# Patient Record
Sex: FEMALE | Race: BLACK OR AFRICAN AMERICAN | Employment: OTHER | URBAN - METROPOLITAN AREA
[De-identification: names, ages, dates, MRNs, and addresses within clinical notes are randomized per-mention and may not be internally consistent; named-entity substitution may affect disease eponyms.]

---

## 2024-10-28 RX ORDER — CARVEDILOL 25 MG/1
25 TABLET ORAL 2 TIMES DAILY WITH MEALS
COMMUNITY

## 2024-11-08 ENCOUNTER — ANESTHESIA (OUTPATIENT)
Dept: ENDOSCOPY | Facility: HOSPITAL | Age: 49
End: 2024-11-08
Payer: COMMERCIAL

## 2024-11-08 ENCOUNTER — HOSPITAL ENCOUNTER (OUTPATIENT)
Facility: HOSPITAL | Age: 49
Setting detail: HOSPITAL OUTPATIENT SURGERY
Discharge: HOME | End: 2024-11-08
Attending: INTERNAL MEDICINE | Admitting: INTERNAL MEDICINE
Payer: COMMERCIAL

## 2024-11-08 ENCOUNTER — ANESTHESIA EVENT (OUTPATIENT)
Dept: ENDOSCOPY | Facility: HOSPITAL | Age: 49
End: 2024-11-08
Payer: COMMERCIAL

## 2024-11-08 VITALS
WEIGHT: 196 LBS | HEIGHT: 63 IN | TEMPERATURE: 98 F | HEART RATE: 63 BPM | DIASTOLIC BLOOD PRESSURE: 59 MMHG | RESPIRATION RATE: 22 BRPM | SYSTOLIC BLOOD PRESSURE: 112 MMHG | BODY MASS INDEX: 34.73 KG/M2 | OXYGEN SATURATION: 100 %

## 2024-11-08 LAB — B-HCG UR QL: NEGATIVE

## 2024-11-08 PROCEDURE — 0DJD8ZZ INSPECTION OF LOWER INTESTINAL TRACT, VIA NATURAL OR ARTIFICIAL OPENING ENDOSCOPIC: ICD-10-PCS | Performed by: INTERNAL MEDICINE

## 2024-11-08 PROCEDURE — 81025 URINE PREGNANCY TEST: CPT

## 2024-11-08 RX ORDER — SODIUM CHLORIDE, SODIUM LACTATE, POTASSIUM CHLORIDE, CALCIUM CHLORIDE 600; 310; 30; 20 MG/100ML; MG/100ML; MG/100ML; MG/100ML
INJECTION, SOLUTION INTRAVENOUS CONTINUOUS
Status: DISCONTINUED | OUTPATIENT
Start: 2024-11-08 | End: 2024-11-08

## 2024-11-08 RX ORDER — NALOXONE HYDROCHLORIDE 0.4 MG/ML
0.08 INJECTION, SOLUTION INTRAMUSCULAR; INTRAVENOUS; SUBCUTANEOUS ONCE AS NEEDED
Status: DISCONTINUED | OUTPATIENT
Start: 2024-11-08 | End: 2024-11-08

## 2024-11-08 RX ADMIN — SODIUM CHLORIDE, SODIUM LACTATE, POTASSIUM CHLORIDE, CALCIUM CHLORIDE: 600; 310; 30; 20 INJECTION, SOLUTION INTRAVENOUS at 10:22:00

## 2024-11-08 NOTE — OPERATIVE REPORT
Colonoscopy Operative Report    Kimberly Evangelista Patient Status:  Hospital Outpatient Surgery    1975 MRN [id number]   [facility] ENDOSCOPY PAIN CENTER Attending Wade Jay MD   Hosp Day #   0 PCP Apoorva Squires MD     Pre-Operative Diagnosis: colon cancer screening    Post-Operative Diagnosis:   normal colon    Procedure Performed: Procedure(s):  COLONOSCOPY     Informed Consent: Informed consent for both the procedure and sedation were obtained from the patient. The potentially life-threatening complications of sedation, bleeding,  perforation, transfusion or repeat endoscopy  were reviewed along with the possible need for hospitalization, surgical management, transfusion or repeat endoscopy should one of these complications arise. The patient understands and is agreeable to proceed.  Sedation Type: MAC-Patient received sedation with monitored anesthesia provided by an anesthesiologist    Cecum Withdrawal Time:  6 minutes  Date of previous colonoscopy: none    Procedure Description: The patient was placed in the left lateral decubitus position.  After careful digital rectal examination, the Adult colonoscope was inserted into the rectum and advanced to the level of the cecum under direct visualization. The cecum was identified by landmarks, including the appendiceal orifice and ileoceccal valve. Careful examination of the entire colon was performed during withdrawal of the endoscope. The scope was withdrawn to the rectum and retroflexion was performed.  The patient tolerated the procedure well with no immediate complications. The patient was transferred to the recovery area in stable condition.  Quality of Preparation: Adequate  Aronchick Bowel Prep Scale:  good  Findings: normal colon  Recommendations: colonoscopy in 10 years  Discharge:  The patient was given an after visit summary detailing the procedure, findings, recommendations and  follow up plans.     Wade Jay MD  11/8/2024  9:27 AM

## 2024-11-08 NOTE — ANESTHESIA POSTPROCEDURE EVALUATION
[facility]    Kimberly Evangelista Patient Status:  Hospital Outpatient Surgery   Age/Gender 49 year old female MRN [id number]   Location [facility] ENDOSCOPY PAIN CENTER Attending Wade Jay MD   Hosp Day # 0 PCP Apoorva Squires MD       Anesthesia Post-op Note    COLONOSCOPY    Procedure Summary       Date: 11/08/24 Room / Location:  ENDOSCOPY 03 / [facility] ENDOSCOPY    Anesthesia Start: 0957 Anesthesia Stop:     Procedure: COLONOSCOPY Diagnosis: (normal)    Surgeons: Wade Jay MD Anesthesiologist: Shelby Echevarria DO    Anesthesia Type: MAC ASA Status: 3            Anesthesia Type: MAC    Vitals Value Taken Time   BP 99/50 11/08/24 1026   Temp  11/08/24 1027   Pulse 81 11/08/24 1027   Resp 16 11/08/24 1027   SpO2 100 % 11/08/24 1027   Vitals shown include unfiled device data.    Patient Location: Endoscopy    Anesthesia Type: MAC    Airway Patency: patent    Postop Pain Control: adequate    Mental Status: mildly sedated but able to meaningfully participate in the post-anesthesia evaluation    Nausea/Vomiting: none    Cardiopulmonary/Hydration status: stable euvolemic    Complications: no apparent anesthesia related complications    Postop vital signs: stable    Dental Exam: Unchanged from Preop    Patient to be discharged home when criteria met.

## 2024-11-08 NOTE — ANESTHESIA PREPROCEDURE EVALUATION
PRE-OP EVALUATION    Patient Name: Kimberly Evangelista    Admit Diagnosis: colon cancer screening    Pre-op Diagnosis: colon cancer screening    COLONOSCOPY    Anesthesia Procedure: COLONOSCOPY    Surgeons and Role:     * Wade Jay MD - Primary    Pre-op vitals reviewed.  Temp: 97.9 °F (36.6 °C)  Pulse: 59  Resp: 22  BP: 106/48  SpO2: 100 %  Body mass index is 34.72 kg/m².    Current medications reviewed.  Hospital Medications:   lactated ringers infusion   Intravenous Continuous       Outpatient Medications:   Prescriptions Prior to Admission[1]    Allergies: Patient has no known allergies.      Anesthesia Evaluation    Patient summary reviewed.    Anesthetic Complications  (-) history of anesthetic complications         GI/Hepatic/Renal    Negative GI/hepatic/renal ROS.                             Cardiovascular  Comment: Conclusions    Summary:    1. Left ventricle: The cavity size is normal. Wall thickness is normal.    Systolic function is normal by visual assessment. The estimated ejection    fraction is 60-65%. Wall motion is normal; there are no regional wall    motion abnormalities. Left ventricular diastolic function parameters are    normal.  2. Right ventricle: The cavity size is normal. Wall thickness is normal.    Systolic function is normal by visual assessment. The RV pressure during    systole is 40mm Hg.  3. Left atrium: The atrial volume is mildly increased.  4. Tricuspid valve: There is mild regurgitation.  Impressions:  Compared to prior study dated 5/22/2024, left ventricular  systolic function has normalized (prior LVEF 50%).    Prepared and signed by  Kike Almanzar MD.  10/10/2024 16:14          Negative cardiovascular ROS.  ECG reviewed.  Exercise tolerance: good     MET: >4      (+) hypertension           (+) pacemaker/AICD (nicm)          (+) CHF                Endo/Other    Negative endo/other ROS.                              Pulmonary    Negative pulmonary ROS.                        Patient here today for follow up PCA, hx of DVP, rising PSA 0.16^    Concerns include incontinence- dribbles no ppd.  Denies symptoms frequency, urgency, intermittency, incomplete emptying, weak stream, nocturia, hematuria, dysuria and straining.    Denies known Latex allergy or symptoms of Latex sensitivity.  Medications verified, no changes.    Patient reports taking the Revatio- image distortion, odd colors, stopped taking 2-3 months ago          Neuro/Psych    Negative neuro/psych ROS.                                  Past Surgical History:   Procedure Laterality Date    Cardiac pacemaker placement      Cataract      Eye surgery       Social History     Socioeconomic History    Marital status:    Tobacco Use    Smoking status: Never    Smokeless tobacco: Never   Vaping Use    Vaping status: Never Used   Substance and Sexual Activity    Alcohol use: Never    Drug use: Never     History   Drug Use Unknown     Available pre-op labs reviewed.               Airway      Mallampati: I  Mouth opening: >3 FB  TM distance: 4 - 6 cm  Neck ROM: full Cardiovascular    Cardiovascular exam normal.  Rhythm: regular  Rate: normal  (-) murmur   Dental    Dentition appears grossly intact         Pulmonary    Pulmonary exam normal.  Breath sounds clear to auscultation bilaterally.               Other findings              ASA: 3   Plan: MAC  NPO status verified and patient meets guidelines.  Patient has not taken beta blockers in last 24 hours.  Post-procedure pain management plan discussed with surgeon and patient.      Plan/risks discussed with: patient                Present on Admission:  **None**             [1]   Medications Prior to Admission   Medication Sig Dispense Refill Last Dose/Taking    sacubitril-valsartan 24-26 MG Oral Tab Take 1 tablet by mouth 2 (two) times daily.   11/8/2024 Morning    carvedilol 25 MG Oral Tab Take 1 tablet (25 mg total) by mouth 2 (two) times daily with meals.   11/8/2024 Morning    semaglutide-weight management 0.5 MG/0.5ML Subcutaneous Solution Auto-injector Inject 0.5 mL (0.5 mg total) into the skin once a week.   10/26/2024

## 2024-11-08 NOTE — H&P
[facility]   part of [facility]    History & Physical    Kimberly Evangelista Patient Status:  Hospital Outpatient Surgery    1975 MRN [id number]   Location [facility] ENDOSCOPY PAIN CENTER Attending Wade Jay MD   Hosp Day # 0 PCP Apoorva Squires MD     Date:  2024  Date of Admission:  2024    History provided by:patient  Chief Complaint:   colon cancer screening      HPI:   Kimberly Evangelista is a(n) 49 year old female. Here for colon cancer screening    History     Past Medical History:    Arrhythmia    Cardiomyopathy (HCC)    Cataract    Congestive heart disease (HCC)    Glaucoma    Heart attack (HCC)    High blood pressure    Visual impairment    GLASSES     Past Surgical History:   Procedure Laterality Date    Cardiac pacemaker placement      Cataract      Eye surgery       History reviewed. No pertinent family history.  Social History:  Social History     Socioeconomic History    Marital status:    Tobacco Use    Smoking status: Never    Smokeless tobacco: Never   Vaping Use    Vaping status: Never Used   Substance and Sexual Activity    Alcohol use: Never    Drug use: Never     Social Drivers of Health     Food Insecurity: No Food Insecurity (2024)    Received from [facility]    Food Insecurity     Currently or in the past 3 months, have you worried your food would run out before you had money to buy more?: No     In the past 12 months, have you run out of food or been unable to get more?: No   Transportation Needs: No Transportation Needs (2024)    Received from [facility]    Transportation Needs     Medical Transportation Needs?: No    Received from [facility]    Social Connections    Received from [facility]    Housing Stability     Allergies/Medications:   Allergies: Allergies[1]  Medications Prior to Admission   Medication Sig    sacubitril-valsartan 24-26 MG Oral Tab Take 1  tablet by mouth 2 (two) times daily.    carvedilol 25 MG Oral Tab Take 1 tablet (25 mg total) by mouth 2 (two) times daily with meals.    semaglutide-weight management 0.5 MG/0.5ML Subcutaneous Solution Auto-injector Inject 0.5 mL (0.5 mg total) into the skin once a week.       Review of Systems:   Pertinent items are noted in HPI.  A comprehensive review of systems was negative.    Physical Exam:   Vital Signs:  Height 5' 3\" (1.6 m), weight 194 lb (88 kg), last menstrual period 10/21/2024.     General appearance:  alert, appears stated age, and cooperative  Head: Normocephalic, without obvious abnormality, atraumatic  Pulmonary: clear to auscultation bilaterally  Cardiovascular: S1, S2 normal, no murmur, click, rub or gallop, regular rate and rhythm  Abdominal: soft, non-tender; bowel sounds normal; no masses,  no organomegaly  Extremities: extremities normal, atraumatic, no cyanosis or edema        Results:   No results found for: \"WBC\", \"HGB\", \"HCT\", \"PLT\", \"CREATSERUM\", \"BUN\", \"NA\", \"K\", \"CL\", \"CO2\", \"GLU\", \"CA\", \"ALB\", \"ALKPHO\", \"BILT\", \"TP\", \"AST\", \"ALT\", \"PTT\", \"INR\", \"PT\", \"T4F\", \"TSH\", \"TSHREFLEX\", \"ELIN\", \"LIP\", \"GGT\", \"PSA\", \"DDIMER\", \"ESRML\", \"ESRPF\", \"CRP\", \"BNP\", \"MG\", \"PHOS\", \"TROP\", \"CK\", \"CKMB\", \"CHICA\", \"RPR\", \"B12\", \"ETOH\", \"POCGLU\"    No results found.        Assessment/Plan:    colonoscopy      Wade Jay MD  11/8/2024         [1] No Known Allergies

## 2024-11-08 NOTE — DISCHARGE INSTRUCTIONS
ENDOSCOPY DISCHARGE INSTRUCTIONS    Procedure Performed:   Colonoscopy    Endoscopist: No name on file  FINDINGS:   Normal colon    MEDICATIONS:  You may resume all other medications today    DIET:  Resume Normal Diet    BIOPSIES:  No biopsies were taken    X-RAYS/LABS:   No X-rays/Labs were ordered today    ADDITIONAL RECOMMENDATIONS:    Colonoscopy in 10 years    Activity for remainder of today:    REST TODAY  DO NOT drive or operate heavy machinery  DO NOT drink any alcoholic beverages  DO NOT sign any legal documents or make any important decisions    After your procedure(s):  It is not unusual to feel bloated or gassy .  Passing gas and belching is encouraged. Lying on your left side with your knees flexed may relieve the discomfort. A hot pack to the abdomen may also help.    After your gastroscopy (upper endoscopy): You may experience a slight sore throat which will subside. Throat lozenges or salt water gargle can be used.    FOLLOW-UP:  Contact the office at [phone] for follow-up appointment is needed or if you develop any of the following:    Severe abdominal pain/discomfort     Excessive bleeding                     Black tarry stool    Difficulty breathing/swallowing      Persistent nausea/vomiting  Fever above 100 degrees or chills

## 2025-03-25 NOTE — PAT NURSING NOTE
Called and spoke with pt regarding PAT call. She does not use my chart. We reviewed instructions and she v/u.     Will get another phone call the day before w/ TOA. Check in at  Reg desk at [facility]. NPO after midnoc. As instructed, wean Carvedilol to 12.5 mg BID x 3 days (3/26-3/28), then off Carvedilol x 3 days (3/29-3/31). Hold Wegovy for 7 days prior to procedure (last dose was on 3/22). Hold off on vitamins for 1 week prior (pt not on any). Hold Entresto for 24 hours prior to procedure, last dose on Monday 3/31 in the AM. Shower w/ antibacterial soap before coming in. Pt's spouse will be driving her to/from procedure. Questions answered and she v/u.

## 2025-04-01 ENCOUNTER — ANESTHESIA (OUTPATIENT)
Dept: INTERVENTIONAL RADIOLOGY/VASCULAR | Facility: HOSPITAL | Age: 50
End: 2025-04-01
Payer: COMMERCIAL

## 2025-04-01 ENCOUNTER — HOSPITAL ENCOUNTER (OUTPATIENT)
Dept: INTERVENTIONAL RADIOLOGY/VASCULAR | Facility: HOSPITAL | Age: 50
Discharge: HOME | End: 2025-04-01
Attending: INTERNAL MEDICINE | Admitting: INTERNAL MEDICINE
Payer: COMMERCIAL

## 2025-04-01 VITALS
BODY MASS INDEX: 33.31 KG/M2 | RESPIRATION RATE: 11 BRPM | HEART RATE: 70 BPM | HEIGHT: 63 IN | TEMPERATURE: 97 F | DIASTOLIC BLOOD PRESSURE: 64 MMHG | WEIGHT: 188 LBS | SYSTOLIC BLOOD PRESSURE: 102 MMHG | OXYGEN SATURATION: 98 %

## 2025-04-01 DIAGNOSIS — I47.20 VT (VENTRICULAR TACHYCARDIA) (HCC): ICD-10-CM

## 2025-04-01 PROCEDURE — 93005 ELECTROCARDIOGRAM TRACING: CPT

## 2025-04-01 PROCEDURE — 93010 ELECTROCARDIOGRAM REPORT: CPT | Performed by: INTERNAL MEDICINE

## 2025-04-01 PROCEDURE — 02583ZZ DESTRUCTION OF CONDUCTION MECHANISM, PERCUTANEOUS APPROACH: ICD-10-PCS | Performed by: INTERNAL MEDICINE

## 2025-04-01 PROCEDURE — 93620 COMP EP EVL R AT VEN PAC&REC: CPT | Performed by: INTERNAL MEDICINE

## 2025-04-01 PROCEDURE — 02K83ZZ MAP CONDUCTION MECHANISM, PERCUTANEOUS APPROACH: ICD-10-PCS | Performed by: INTERNAL MEDICINE

## 2025-04-01 PROCEDURE — B24CYZZ ULTRASONOGRAPHY OF PERICARDIUM USING OTHER CONTRAST: ICD-10-PCS | Performed by: INTERNAL MEDICINE

## 2025-04-01 PROCEDURE — 93662 INTRACARDIAC ECG (ICE): CPT | Performed by: INTERNAL MEDICINE

## 2025-04-01 PROCEDURE — 4A023FZ MEASUREMENT OF CARDIAC RHYTHM, PERCUTANEOUS APPROACH: ICD-10-PCS | Performed by: INTERNAL MEDICINE

## 2025-04-01 PROCEDURE — 4A0234Z MEASUREMENT OF CARDIAC ELECTRICAL ACTIVITY, PERCUTANEOUS APPROACH: ICD-10-PCS | Performed by: INTERNAL MEDICINE

## 2025-04-01 PROCEDURE — 93623 PRGRMD STIMJ&PACG IV RX NFS: CPT | Performed by: INTERNAL MEDICINE

## 2025-04-01 RX ORDER — HEPARIN SODIUM 1000 [USP'U]/ML
INJECTION, SOLUTION INTRAVENOUS; SUBCUTANEOUS
Status: COMPLETED
Start: 2025-04-01 | End: 2025-04-01

## 2025-04-01 RX ORDER — LABETALOL HYDROCHLORIDE 5 MG/ML
5 INJECTION, SOLUTION INTRAVENOUS EVERY 5 MIN PRN
Status: DISCONTINUED | OUTPATIENT
Start: 2025-04-01 | End: 2025-04-01

## 2025-04-01 RX ORDER — MIDAZOLAM HYDROCHLORIDE 1 MG/ML
INJECTION INTRAMUSCULAR; INTRAVENOUS AS NEEDED
Status: DISCONTINUED | OUTPATIENT
Start: 2025-04-01 | End: 2025-04-01 | Stop reason: SURG

## 2025-04-01 RX ORDER — HYDROMORPHONE HYDROCHLORIDE 1 MG/ML
0.4 INJECTION, SOLUTION INTRAMUSCULAR; INTRAVENOUS; SUBCUTANEOUS EVERY 5 MIN PRN
Status: DISCONTINUED | OUTPATIENT
Start: 2025-04-01 | End: 2025-04-01

## 2025-04-01 RX ORDER — ACETAMINOPHEN 500 MG
1000 TABLET ORAL ONCE AS NEEDED
Status: DISCONTINUED | OUTPATIENT
Start: 2025-04-01 | End: 2025-04-01

## 2025-04-01 RX ORDER — NALOXONE HYDROCHLORIDE 0.4 MG/ML
0.08 INJECTION, SOLUTION INTRAMUSCULAR; INTRAVENOUS; SUBCUTANEOUS AS NEEDED
Status: DISCONTINUED | OUTPATIENT
Start: 2025-04-01 | End: 2025-04-01

## 2025-04-01 RX ORDER — SODIUM CHLORIDE, SODIUM LACTATE, POTASSIUM CHLORIDE, CALCIUM CHLORIDE 600; 310; 30; 20 MG/100ML; MG/100ML; MG/100ML; MG/100ML
INJECTION, SOLUTION INTRAVENOUS CONTINUOUS
Status: DISCONTINUED | OUTPATIENT
Start: 2025-04-01 | End: 2025-04-01

## 2025-04-01 RX ORDER — LIDOCAINE HYDROCHLORIDE AND EPINEPHRINE 10; 10 MG/ML; UG/ML
INJECTION, SOLUTION INFILTRATION; PERINEURAL
Status: COMPLETED
Start: 2025-04-01 | End: 2025-04-01

## 2025-04-01 RX ORDER — ONDANSETRON 2 MG/ML
4 INJECTION INTRAMUSCULAR; INTRAVENOUS EVERY 6 HOURS PRN
Status: DISCONTINUED | OUTPATIENT
Start: 2025-04-01 | End: 2025-04-01

## 2025-04-01 RX ORDER — HYDROCODONE BITARTRATE AND ACETAMINOPHEN 5; 325 MG/1; MG/1
2 TABLET ORAL ONCE AS NEEDED
Status: DISCONTINUED | OUTPATIENT
Start: 2025-04-01 | End: 2025-04-01

## 2025-04-01 RX ORDER — HYDROCODONE BITARTRATE AND ACETAMINOPHEN 5; 325 MG/1; MG/1
1 TABLET ORAL ONCE AS NEEDED
Status: DISCONTINUED | OUTPATIENT
Start: 2025-04-01 | End: 2025-04-01

## 2025-04-01 RX ORDER — ISOPROTERENOL HYDROCHLORIDE 0.2 MG/ML
INJECTION, SOLUTION INTRAVENOUS
Status: COMPLETED
Start: 2025-04-01 | End: 2025-04-01

## 2025-04-01 RX ORDER — HYDROMORPHONE HYDROCHLORIDE 1 MG/ML
0.6 INJECTION, SOLUTION INTRAMUSCULAR; INTRAVENOUS; SUBCUTANEOUS EVERY 5 MIN PRN
Status: DISCONTINUED | OUTPATIENT
Start: 2025-04-01 | End: 2025-04-01

## 2025-04-01 RX ORDER — SODIUM CHLORIDE 9 MG/ML
INJECTION, SOLUTION INTRAVENOUS
Status: COMPLETED | OUTPATIENT
Start: 2025-04-02 | End: 2025-04-01

## 2025-04-01 RX ORDER — HEPARIN SODIUM 5000 [USP'U]/ML
INJECTION, SOLUTION INTRAVENOUS; SUBCUTANEOUS
Status: COMPLETED
Start: 2025-04-01 | End: 2025-04-01

## 2025-04-01 RX ORDER — LIDOCAINE HYDROCHLORIDE 10 MG/ML
INJECTION, SOLUTION EPIDURAL; INFILTRATION; INTRACAUDAL; PERINEURAL AS NEEDED
Status: DISCONTINUED | OUTPATIENT
Start: 2025-04-01 | End: 2025-04-01 | Stop reason: SURG

## 2025-04-01 RX ORDER — CARVEDILOL 25 MG/1
12.5 TABLET ORAL 2 TIMES DAILY WITH MEALS
Qty: 60 TABLET | Refills: 2 | Status: SHIPPED | OUTPATIENT
Start: 2025-04-01

## 2025-04-01 RX ORDER — ALBUTEROL SULFATE 0.83 MG/ML
2.5 SOLUTION RESPIRATORY (INHALATION) AS NEEDED
Status: DISCONTINUED | OUTPATIENT
Start: 2025-04-01 | End: 2025-04-01

## 2025-04-01 RX ORDER — HYDROMORPHONE HYDROCHLORIDE 1 MG/ML
0.2 INJECTION, SOLUTION INTRAMUSCULAR; INTRAVENOUS; SUBCUTANEOUS EVERY 5 MIN PRN
Status: DISCONTINUED | OUTPATIENT
Start: 2025-04-01 | End: 2025-04-01

## 2025-04-01 RX ADMIN — SODIUM CHLORIDE: 9 INJECTION, SOLUTION INTRAVENOUS at 07:12:00

## 2025-04-01 RX ADMIN — LIDOCAINE HYDROCHLORIDE 30 MG: 10 INJECTION, SOLUTION EPIDURAL; INFILTRATION; INTRACAUDAL; PERINEURAL at 07:22:00

## 2025-04-01 RX ADMIN — MIDAZOLAM HYDROCHLORIDE 2 MG: 1 INJECTION INTRAMUSCULAR; INTRAVENOUS at 07:15:00

## 2025-04-01 RX ADMIN — SODIUM CHLORIDE: 9 INJECTION, SOLUTION INTRAVENOUS at 09:22:00

## 2025-04-01 NOTE — ANESTHESIA PROCEDURE NOTES
Arterial Line    Date/Time: 4/1/2025 7:20 AM    Performed by: Leo Stephens MD  Authorized by: Leo Stephens MD    General Information and Staff    Procedure Start:  4/1/2025 7:20 AM  Procedure End:  4/1/2025 7:22 AM  Anesthesiologist:  Leo Stephens MD  Performed By:  Anesthesiologist  Patient location: EP/IR.  Indication: continuous blood pressure monitoring and blood sampling needed    Site Identification: real time ultrasound guided and surface landmarks    Preanesthetic Checklist: 2 patient identifiers, IV checked, risks and benefits discussed, monitors and equipment checked, pre-op evaluation, timeout performed, anesthesia consent and sterile technique used    Procedure Details    Catheter Size:  20 G  Catheter Length:  1 and 3/4 inch  Catheter Type:  Arrow  Seldinger Technique?: Yes    Laterality:  Left  Site:  Radial artery  Site Prep: chlorhexidine    Line Secured:  Tape and Tegaderm    Assessment    Events: patient tolerated procedure well with no complications      Medications  4/1/2025 7:20 AM      Additional Comments    Skin wheal with 1% lidocaine 0.3 ml

## 2025-04-01 NOTE — PROCEDURES
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 The risks (including, but not limited to, hematoma, vascular damage, pneumothorax, tamponade, need for a pacemaker, phrenic nerve injury, myocardial infarction, stroke, and death), benefits (no supraventricular tachycardia), and alternatives (medications, anti-arrhythmic drugs, observation) of the procedure were discussed. The patient understands and agrees to proceed.      Procedure:  The patient was brought to the electrophysiology study in the fasting and nonsedated state. The left and right groins were prepped and draped in the usual sterile fashion. MAC was administered by the anesthesia service. 1% lidocaine was used for skin anesthesia. 7. 8. And a 10 Fr sheath were placed in the right femoral vein using ultrasound guidance. Catheters were placed in the appropriate positions under fluoroscopic guidance.    Procedures performed:  Comprehensive EP study  Pacing and recording of the LA from the CS  Attempted induction of arrhythmia with and without isoproterenol and epinephrine  3D mapping of tachycardia with CARTO  Radiofrequency ablation    After programmed stimulation and ablation, the catheters and sheaths were removed and hemostasis was achieved with Vascade    Results:  Baseline intervals: SCL: 800, RI: 150, QRS: 85  AVBCL: 400    VT was not inducible with atrial pacing, ventricular pacing, or ventricular extrastimuli  Atrial fibrillation was induced twice and was non-sustained     Isoproterenol at 6mcg/min as well as epinephrine at 8 mg/min was used and sedation was stopped. Despite this, ventricular tachycardia was still not inducible    There was no pericardial effusion at the end of the procedure    Conclusions:   Noninducible ventricular tachycardia   Restart coreg      Derrick Nair MD  Clinical Cardiac Electrophysiology  [facility]  [facility]

## 2025-04-01 NOTE — ANESTHESIA PREPROCEDURE EVALUATION
PRE-OP EVALUATION    Patient Name: Kimberly Evangelista    Admit Diagnosis: VT (ventricular tachycardia) ([facility]) [I47.20]    Pre-op Diagnosis: * No pre-op diagnosis entered *        Anesthesia Procedure: CATH EP    * No surgeons found in log *    Pre-op vitals reviewed.  Temp: 97.4 °F (36.3 °C)  Pulse: 74  Resp: 14  BP: 122/69  SpO2: 99 %  Body mass index is 33.3 kg/m².    Current medications reviewed.  Hospital Medications:   [START ON 4/2/2025] sodium chloride 0.9% infusion   Intravenous On Call       Outpatient Medications:   Prescriptions Prior to Admission[1]    Allergies: Patient has no known allergies.      Anesthesia Evaluation        Anesthetic Complications           GI/Hepatic/Renal    Negative GI/hepatic/renal ROS.                             Cardiovascular  Comment: TTE 2024  Summary:     1. Left ventricle: The cavity size is normal. Wall thickness is normal.      Systolic function is normal by visual assessment. The estimated ejection      fraction is 60-65%. Wall motion is normal; there are no regional wall      motion abnormalities. Left ventricular diastolic function parameters are      normal.   2. Right ventricle: The cavity size is normal. Wall thickness is normal.      Systolic function is normal by visual assessment. The RV pressure during      systole is 40mm Hg.   3. Left atrium: The atrial volume is mildly increased.   4. Tricuspid valve: There is mild regurgitation.   Impressions:  Compared to prior study dated 5/22/2024, left ventricular                   (+) hypertension                  (+) dysrhythmias   (+) CHF                Endo/Other    Negative endo/other ROS.                              Pulmonary    Negative pulmonary ROS.                       Neuro/Psych    Negative neuro/psych ROS.                          Per epic note:    Impression:  ventricular tachycardia, outflow tract  non-ischemic cardiomyopathy  S/p ICD    Plan:  Etiology of ventricular tachycardia: outflow tract  entresto  24-26 bid  She would like to attempt an ablation. Discussed lower success without frequent arrhythmias but possibly with stopping coreg for procedure will have better chance of seeing them  Hold coreg for ablation           Past Surgical History:   Procedure Laterality Date    Cardiac defibrillator placement      Cataract      Colonoscopy N/A 11/08/2024    Procedure: COLONOSCOPY;  Surgeon: Wade Jay MD;  Location:  ENDOSCOPY    Eye surgery       Social History     Socioeconomic History    Marital status:    Tobacco Use    Smoking status: Never    Smokeless tobacco: Never   Vaping Use    Vaping status: Never Used   Substance and Sexual Activity    Alcohol use: Never    Drug use: Never     History   Drug Use Unknown     Available pre-op labs reviewed.               Airway      Mallampati: III  Mouth opening: 3 FB  TM distance: 4 - 6 cm  Neck ROM: full Cardiovascular      Rhythm: regular  Rate: normal     Dental    Dentition appears grossly intact         Pulmonary      Breath sounds clear to auscultation bilaterally.               Other findings              ASA: 3   Plan: MAC  NPO status verified and patient meets guidelines.    Post-procedure pain management plan discussed with surgeon and patient.    Comment: I explained the intrinsic risks of MAC anesthesia to Kimberly Evangelista including intraoperative awareness/recall, PONV, post-operative pain/discomfort, risk of aspiration, sore throat, airway management and, conversion to general anesthesia. Pt endorses understanding. All questions answered and concerns addressed.      Plan/risks discussed with: patient  Use of blood product(s) discussed with: patient    Consented to blood products.          Present on Admission:  **None**             [1]   Medications Prior to Admission   Medication Sig Dispense Refill Last Dose/Taking    sacubitril-valsartan 24-26 MG Oral Tab Take 1 tablet by mouth 2 (two) times daily.   3/30/2025    carvedilol 25 MG Oral Tab  Take 1 tablet (25 mg total) by mouth 2 (two) times daily with meals.   3/30/2025    semaglutide-weight management 0.5 MG/0.5ML Subcutaneous Solution Auto-injector Inject 0.5 mL (0.5 mg total) into the skin once a week.   3/23/2025

## 2025-04-01 NOTE — H&P
49 year old female    In 5/2024, was having dyspnea on exertion and lightheadedness.   Holter: lots of ventricular tachycardia   EF was low and placed ICD   MRI and cath were noraml  Amiodarone stopped 1m later 6/2024    As of 2/14/2025   On monitor ahd some non-sustained ventricular tachycardia associated iwht lightheadedness     As of 4/1/2025  Here for ablation. She has weaned off coreg    Denies chest pain, shortness of breath, palpitations, lightheadedness, syncope, orthopnea, paroxysmal nocturnal dyspnea, or edema.    Past Medical History:  non-ischemic cardiomyopathy  S/p ICD    Medications:  Current Outpatient Medications:   sacubitril-valsartan (ENTRESTO) 24-26 MG Oral Tab, Take 1 tablet by mouth 2 (two) times daily., Disp: 180 tablet, Rfl: 3  carvedilol 25 MG Oral Tab, Take 1 tablet (25 mg total) by mouth 2 (two) times daily with meals., Disp: 180 tablet, Rfl: 3  semaglutide-weight management (WEGOVY) 1.7 MG/0.75ML Subcutaneous Solution Auto-injector, Inject 0.75 mL (1.7 mg total) into the skin once a week., Disp: 4 each, Rfl: 2  latanoprost 0.005 % Ophthalmic Solution, Place 1 drop into the left eye nightly., Disp: 1 each, Rfl: 5    Physical:  /62  Pulse 75  Ht 5' 3\" (1.6 m)  Wt 192 lb (87.1 kg)  LMP 12/25/2024 (Approximate)  SpO2 98%  BMI 34.01 kg/m²   GENERAL: well developed, well nourished, in no apparent distress  EYES: sclera anicteric  HEENT: normocephalic  NECK: no JVD, no carotid bruits  RESPIRATORY: clear to auscultation  CARDIOVASCULAR: S1, S2 normal, RRR; no S3, no S4; no click; no murmur  ABDOMEN: normal active BS, nondistended  EXTREMITIES: no cyanosis, clubbing or edema, peripheral pulses intact  SKIN: no rashes, left device site normal     Data:  ECG: 3/11/2024: SR couplets: LBBB inferior axis, QS in V1 with rapid transiton V2-->V3  Device: dual chamber Medtronic ICD imapltned 5/2024. Some non-sustained ventricular tachycardia   Echo: 10/2024: E F60-65, mild LAE  Ziopatch:  1/2025: 6d: SR avg 74, 16 runs of non-sustained ventricular tachycardia, <1% PVCs  Tsh: 8/2024: normal    Impression:  ventricular tachycardia, outflow tract  non-ischemic cardiomyopathy  S/p ICD    Plan:  Etiology of ventricular tachycardia: outflow tract  entresto 24-26 bid  She would like to attempt an ablation. Discussed lower success without frequent arrhythmias but possibly with stopping coreg for procedure will have better chance of seeing them  The risks (including, but not limited to, hematoma, vascular damage, pneumothorax, tamponade, need for a pacemaker, phrenic nerve injury, myocardial infarction, stroke, and death), benefits (less or no atrial fibrillation), and alternatives (medications, anti-arrhythmic drugs) of the procedure were discussed. The patient understands and agrees to proceed.

## 2025-04-01 NOTE — ANESTHESIA POSTPROCEDURE EVALUATION
[facility]    Kimberly Evangelista Patient Status:  Outpatient   Age/Gender 49 year old female MRN [id number]   Location [facility] INTERVENTIONAL SUITES Attending Derrick Nair MD   Hosp Day # 0 PCP Apoorva Squires MD       Anesthesia Post-op Note        Procedure Summary       Date: 04/01/25 Room / Location: [facility] Interventional Suites    Anesthesia Start: 0712 Anesthesia Stop: 0935    Procedure: CATH EP Diagnosis:       VT (ventricular tachycardia) (HCC)      VT (ventricular tachycardia) (HCC)    Scheduled Providers: Leo Stephens MD Anesthesiologist: Leo Stephens MD    Anesthesia Type: MAC ASA Status: 3            Anesthesia Type: MAC    Vitals Value Taken Time   /70 04/01/25 0937   Temp 98.5 04/01/25 0937   Pulse 92 04/01/25 0937   Resp 18 04/01/25 0937   SpO2 98 04/01/25 0937       VT RFA    Patient Location: PACU    Anesthesia Type: MAC    Airway Patency: patent    Postop Pain Control: adequate    Nausea/Vomiting: none    Cardiopulmonary/Hydration status: stable euvolemic    Complications: no apparent anesthesia related complications    Postop vital signs: stable    Dental Exam: Unchanged from Preop    Patient to be discharged from PACU when criteria met.

## 2025-04-01 NOTE — PROGRESS NOTES
Pt s/p attempted ablation with Dr. Nair. Pt recovered in holding. Pt  at bedside. Right groin dressing cdi, soft. Pt flat for 2 hours then HOB elevated. Left wrist arterial line removed and pressure held. Gauze with coban applied. Pt ate and drank. Discharge instructions reviewed with pt and  and copy given. Dr. Nair at bedside and spoke to pt and . After recovery pt out of bed to bathroom and zavala with Nel with no complaints. Right groin dressing remained cdi, soft. IV's removed and pt dressed. Pt discharged to [facility] via wheelchair by volunteer. Pt left with belongings. Pt  drove pt home.

## 2025-04-02 LAB
ATRIAL RATE: 67 BPM
P AXIS: 50 DEGREES
P-R INTERVAL: 160 MS
Q-T INTERVAL: 400 MS
QRS DURATION: 90 MS
QTC CALCULATION (BEZET): 422 MS
R AXIS: 45 DEGREES
T AXIS: 50 DEGREES
VENTRICULAR RATE: 67 BPM

## 2025-05-02 ENCOUNTER — APPOINTMENT (OUTPATIENT)
Dept: GENERAL RADIOLOGY | Facility: HOSPITAL | Age: 50
End: 2025-05-02
Attending: EMERGENCY MEDICINE
Payer: COMMERCIAL

## 2025-05-02 ENCOUNTER — HOSPITAL ENCOUNTER (OUTPATIENT)
Facility: HOSPITAL | Age: 50
Setting detail: OBSERVATION
Discharge: HOME | End: 2025-05-02
Attending: EMERGENCY MEDICINE
Payer: COMMERCIAL

## 2025-05-02 ENCOUNTER — APPOINTMENT (OUTPATIENT)
Dept: CV DIAGNOSTICS | Facility: HOSPITAL | Age: 50
End: 2025-05-02
Payer: COMMERCIAL

## 2025-05-02 VITALS
OXYGEN SATURATION: 99 % | RESPIRATION RATE: 19 BRPM | TEMPERATURE: 98 F | HEART RATE: 65 BPM | SYSTOLIC BLOOD PRESSURE: 112 MMHG | WEIGHT: 189 LBS | BODY MASS INDEX: 33.49 KG/M2 | DIASTOLIC BLOOD PRESSURE: 69 MMHG | HEIGHT: 63 IN

## 2025-05-02 DIAGNOSIS — R07.9 ACUTE CHEST PAIN: Primary | ICD-10-CM

## 2025-05-02 DIAGNOSIS — Z45.02 ICD (IMPLANTABLE CARDIOVERTER-DEFIBRILLATOR) DISCHARGE: ICD-10-CM

## 2025-05-02 LAB
ALBUMIN SERPL-MCNC: 4.3 G/DL (ref 3.2–4.8)
ALBUMIN/GLOB SERPL: 1.7 {RATIO} (ref 1–2)
ALP LIVER SERPL-CCNC: 41 U/L (ref 39–100)
ALT SERPL-CCNC: 22 U/L (ref 10–49)
ANION GAP SERPL CALC-SCNC: 7 MMOL/L (ref 0–18)
AST SERPL-CCNC: 19 U/L (ref ?–34)
ATRIAL RATE: 68 BPM
BASOPHILS # BLD AUTO: 0.01 X10(3) UL (ref 0–0.2)
BASOPHILS NFR BLD AUTO: 0.1 %
BILIRUB SERPL-MCNC: 0.5 MG/DL (ref 0.3–1.2)
BUN BLD-MCNC: 8 MG/DL (ref 9–23)
CALCIUM BLD-MCNC: 8.9 MG/DL (ref 8.7–10.6)
CHLORIDE SERPL-SCNC: 107 MMOL/L (ref 98–112)
CO2 SERPL-SCNC: 27 MMOL/L (ref 21–32)
CREAT BLD-MCNC: 0.78 MG/DL (ref 0.55–1.02)
D DIMER PPP FEU-MCNC: 0.67 UG/ML FEU (ref ?–0.5)
EGFRCR SERPLBLD CKD-EPI 2021: 94 ML/MIN/1.73M2 (ref 60–?)
EOSINOPHIL # BLD AUTO: 0.09 X10(3) UL (ref 0–0.7)
EOSINOPHIL NFR BLD AUTO: 1.3 %
ERYTHROCYTE [DISTWIDTH] IN BLOOD BY AUTOMATED COUNT: 11.9 %
GLOBULIN PLAS-MCNC: 2.6 G/DL (ref 2–3.5)
GLUCOSE BLD-MCNC: 76 MG/DL (ref 70–99)
HCT VFR BLD AUTO: 34.6 % (ref 35–48)
HGB BLD-MCNC: 12.4 G/DL (ref 12–16)
IMM GRANULOCYTES # BLD AUTO: 0.01 X10(3) UL (ref 0–1)
IMM GRANULOCYTES NFR BLD: 0.1 %
INR BLD: 1.08 (ref 0.8–1.2)
LYMPHOCYTES # BLD AUTO: 3.11 X10(3) UL (ref 1–4)
LYMPHOCYTES NFR BLD AUTO: 46.5 %
MAGNESIUM SERPL-MCNC: 2.1 MG/DL (ref 1.6–2.6)
MCH RBC QN AUTO: 32.4 PG (ref 26–34)
MCHC RBC AUTO-ENTMCNC: 35.8 G/DL (ref 31–37)
MCV RBC AUTO: 90.3 FL (ref 80–100)
MONOCYTES # BLD AUTO: 0.47 X10(3) UL (ref 0.1–1)
MONOCYTES NFR BLD AUTO: 7 %
NEUTROPHILS # BLD AUTO: 3 X10 (3) UL (ref 1.5–7.7)
NEUTROPHILS # BLD AUTO: 3 X10(3) UL (ref 1.5–7.7)
NEUTROPHILS NFR BLD AUTO: 45 %
NT-PROBNP SERPL-MCNC: 44 PG/ML (ref ?–125)
OSMOLALITY SERPL CALC.SUM OF ELEC: 289 MOSM/KG (ref 275–295)
P AXIS: 46 DEGREES
P-R INTERVAL: 154 MS
PLATELET # BLD AUTO: 237 10(3)UL (ref 150–450)
POTASSIUM SERPL-SCNC: 3.5 MMOL/L (ref 3.5–5.1)
PROT SERPL-MCNC: 6.9 G/DL (ref 5.7–8.2)
PROTHROMBIN TIME: 14.1 SECONDS (ref 11.6–14.8)
Q-T INTERVAL: 396 MS
QRS DURATION: 88 MS
QTC CALCULATION (BEZET): 421 MS
R AXIS: 53 DEGREES
RBC # BLD AUTO: 3.83 X10(6)UL (ref 3.8–5.3)
SODIUM SERPL-SCNC: 141 MMOL/L (ref 136–145)
T AXIS: 53 DEGREES
TROPONIN I SERPL HS-MCNC: <3 NG/L (ref ?–34)
VENTRICULAR RATE: 68 BPM
WBC # BLD AUTO: 6.7 X10(3) UL (ref 4–11)

## 2025-05-02 PROCEDURE — 99285 EMERGENCY DEPT VISIT HI MDM: CPT

## 2025-05-02 PROCEDURE — 83880 ASSAY OF NATRIURETIC PEPTIDE: CPT | Performed by: EMERGENCY MEDICINE

## 2025-05-02 PROCEDURE — 71045 X-RAY EXAM CHEST 1 VIEW: CPT | Performed by: EMERGENCY MEDICINE

## 2025-05-02 PROCEDURE — 36415 COLL VENOUS BLD VENIPUNCTURE: CPT

## 2025-05-02 PROCEDURE — 84484 ASSAY OF TROPONIN QUANT: CPT | Performed by: EMERGENCY MEDICINE

## 2025-05-02 PROCEDURE — 85610 PROTHROMBIN TIME: CPT | Performed by: EMERGENCY MEDICINE

## 2025-05-02 PROCEDURE — 93010 ELECTROCARDIOGRAM REPORT: CPT

## 2025-05-02 PROCEDURE — 93005 ELECTROCARDIOGRAM TRACING: CPT

## 2025-05-02 PROCEDURE — 85025 COMPLETE CBC W/AUTO DIFF WBC: CPT | Performed by: EMERGENCY MEDICINE

## 2025-05-02 PROCEDURE — 85379 FIBRIN DEGRADATION QUANT: CPT | Performed by: STUDENT IN AN ORGANIZED HEALTH CARE EDUCATION/TRAINING PROGRAM

## 2025-05-02 PROCEDURE — 93306 TTE W/DOPPLER COMPLETE: CPT

## 2025-05-02 PROCEDURE — 85025 COMPLETE CBC W/AUTO DIFF WBC: CPT

## 2025-05-02 PROCEDURE — 83735 ASSAY OF MAGNESIUM: CPT | Performed by: EMERGENCY MEDICINE

## 2025-05-02 PROCEDURE — 84484 ASSAY OF TROPONIN QUANT: CPT

## 2025-05-02 PROCEDURE — 80053 COMPREHEN METABOLIC PANEL: CPT

## 2025-05-02 PROCEDURE — 80053 COMPREHEN METABOLIC PANEL: CPT | Performed by: EMERGENCY MEDICINE

## 2025-05-02 RX ORDER — FLECAINIDE ACETATE 100 MG/1
100 TABLET ORAL EVERY 12 HOURS SCHEDULED
Qty: 180 TABLET | Refills: 1 | Status: SHIPPED | OUTPATIENT
Start: 2025-05-02

## 2025-05-02 RX ORDER — SODIUM PHOSPHATE, DIBASIC AND SODIUM PHOSPHATE, MONOBASIC 7; 19 G/230ML; G/230ML
1 ENEMA RECTAL ONCE AS NEEDED
Status: DISCONTINUED | OUTPATIENT
Start: 2025-05-02 | End: 2025-05-02

## 2025-05-02 RX ORDER — CARVEDILOL 12.5 MG/1
25 TABLET ORAL 2 TIMES DAILY WITH MEALS
Status: DISCONTINUED | OUTPATIENT
Start: 2025-05-02 | End: 2025-05-02

## 2025-05-02 RX ORDER — ACETAMINOPHEN 500 MG
500 TABLET ORAL EVERY 4 HOURS PRN
Status: DISCONTINUED | OUTPATIENT
Start: 2025-05-02 | End: 2025-05-02

## 2025-05-02 RX ORDER — FLECAINIDE ACETATE 100 MG/1
100 TABLET ORAL EVERY 12 HOURS SCHEDULED
Status: DISCONTINUED | OUTPATIENT
Start: 2025-05-02 | End: 2025-05-02

## 2025-05-02 RX ORDER — BISACODYL 10 MG
10 SUPPOSITORY, RECTAL RECTAL
Status: DISCONTINUED | OUTPATIENT
Start: 2025-05-02 | End: 2025-05-02

## 2025-05-02 RX ORDER — LATANOPROST 50 UG/ML
1 SOLUTION/ DROPS OPHTHALMIC NIGHTLY
COMMUNITY
Start: 2025-04-17

## 2025-05-02 RX ORDER — ONDANSETRON 2 MG/ML
4 INJECTION INTRAMUSCULAR; INTRAVENOUS EVERY 6 HOURS PRN
Status: DISCONTINUED | OUTPATIENT
Start: 2025-05-02 | End: 2025-05-02

## 2025-05-02 RX ORDER — SEMAGLUTIDE 2.4 MG/.75ML
2.4 INJECTION, SOLUTION SUBCUTANEOUS WEEKLY
COMMUNITY

## 2025-05-02 RX ORDER — ASPIRIN 81 MG/1
324 TABLET, CHEWABLE ORAL ONCE
Status: COMPLETED | OUTPATIENT
Start: 2025-05-02 | End: 2025-05-02

## 2025-05-02 RX ORDER — POLYETHYLENE GLYCOL 3350 17 G/17G
17 POWDER, FOR SOLUTION ORAL DAILY PRN
Status: DISCONTINUED | OUTPATIENT
Start: 2025-05-02 | End: 2025-05-02

## 2025-05-02 RX ORDER — PROCHLORPERAZINE EDISYLATE 5 MG/ML
5 INJECTION INTRAMUSCULAR; INTRAVENOUS EVERY 8 HOURS PRN
Status: DISCONTINUED | OUTPATIENT
Start: 2025-05-02 | End: 2025-05-02

## 2025-05-02 RX ORDER — SENNOSIDES 8.6 MG
17.2 TABLET ORAL NIGHTLY PRN
Status: DISCONTINUED | OUTPATIENT
Start: 2025-05-02 | End: 2025-05-02

## 2025-05-02 RX ORDER — CARVEDILOL 12.5 MG/1
12.5 TABLET ORAL 2 TIMES DAILY WITH MEALS
Status: CANCELLED | OUTPATIENT
Start: 2025-05-02

## 2025-05-02 RX ORDER — SACUBITRIL AND VALSARTAN 24; 26 MG/1; MG/1
1 TABLET, FILM COATED ORAL 2 TIMES DAILY
COMMUNITY

## 2025-05-02 RX ORDER — ENOXAPARIN SODIUM 100 MG/ML
40 INJECTION SUBCUTANEOUS DAILY
Status: DISCONTINUED | OUTPATIENT
Start: 2025-05-02 | End: 2025-05-02

## 2025-05-02 NOTE — H&P
[facility] Hospitalist H&P       CC:   Chief Complaint   Patient presents with    Chest Pain Angina        PCP: Apoorva Squires MD    History of Present Illness: Patient is a 49 year old female with PMH sig for nonischemic cardiomyopathy, status post ICD May 2024, HFrEF, VT who presented for evaluation of chest pain and ICD firing.  Patient was in [place] and noticed that her ICD fired 3 times.  The first time she experienced was when she was dancing; she was very much concerned, another shock was delivered shortly after and then she tried to get away from the scene and she had her third shock.  She did not seek any medical attention at that time.  She returned from her trip yesterday night and presented to the ER today.  She is experiencing some chest pain and some shortness of breath.  In the ER, her vital signs were stable.  Her device was interrogated.  Her labs are unremarkable, troponin is negative.  EKG does not have any ST elevation or depressions.  She is accompanied by her  at bedside.  She is pleasant, able to offer meaningful history.  Appears comfortable.    PMH  Past Medical History[1]     PSH  Past Surgical History[2]     ALL:  Allergies[3]     Home Medications:  Medications Taking[4]      Soc Hx  Social History     Tobacco Use    Smoking status: Never    Smokeless tobacco: Never   Substance Use Topics    Alcohol use: Never        Fam Hx  Family History[5]    Review of Systems  Comprehensive ROS reviewed and negative except for what's stated above.     OBJECTIVE:  /65   Pulse 73   Temp 98.1 °F (36.7 °C) (Oral)   Resp 26   Ht 5' 3\" (1.6 m)   Wt 189 lb (85.7 kg)   LMP 03/25/2025 (Exact Date)   SpO2 100%   BMI 33.48 kg/m²   General:  Alert, no distress   Head:  Normocephalic, without obvious abnormality, atraumatic.   Eyes:  Sclera anicteric, No conjunctival pallor, EOMs intact.    Nose: Nares normal. Septum midline. Mucosa normal. No drainage.   Throat: Lips, mucosa, and tongue  normal. Teeth and gums normal.   Neck: Supple, symmetrical, trachea midline,   Lungs:   Clear to auscultation bilaterally. Normal effort   Chest wall:  No tenderness or deformity.   Heart:  Regular rate and rhythm, S1, S2 normal,     Abdomen:   Soft, non-tender. Bowel sounds normal.  Non distended   Extremities: Extremities normal, atraumatic, no cyanosis or edema.   Skin: Skin color, texture, turgor normal. No rashes or lesions.    Neurologic: Normal strength, no focal deficit appreciated     Diagnostic Data:    CBC/Chem  Recent Labs   Lab 05/02/25  1251   WBC 6.7   HGB 12.4   MCV 90.3   .0   INR 1.08       Recent Labs   Lab 05/02/25  1251      K 3.5      CO2 27.0   BUN 8*   CREATSERUM 0.78   GLU 76   CA 8.9   MG 2.1       Recent Labs   Lab 05/02/25  1251   ALT 22   AST 19   ALB 4.3       No results for input(s): \"TROP\" in the last 168 hours.    CXR: image personally reviewed     Radiology: XR CHEST AP PORTABLE  (CPT=71045)  Result Date: 5/2/2025  CONCLUSION:  There is no evidence of active cardiopulmonary disease on this single portable chest radiograph.   LOCATION:  [facility]      Dictated by (CST): Vinicius Pineda MD on 5/02/2025 at 1:55 PM     Finalized by (CST): Vinicius Pineda MD on 5/02/2025 at 1:56 PM           ASSESSMENT / PLAN:      Patient is a 49 year old female with PMH sig for nonischemic cardiomyopathy, status post ICD May 2024, HFrEF, VT who presented for evaluation of chest pain and ICD firing.     Chest pain  Possible VT  ICD firing  Shortness of breath  - In the setting of nonischemic cardiomyopathy  - Currently normal sinus rhythm,  - Troponin x 1 is negative, repeat  - ICD interrogated, history of shocks delivered  - EP evaluation-consult noted-plan for Coreg twice daily  - 2D echo  - In the setting of recent travel, obtain dimer to rule out PE  -Telemetry    Nonischemic cardiomyopathy HFrEF  - Repeat 2D echo  - Continue Entresto  - Increased Coreg per EP    Obesity  - Wegovy  outpatient    FN:  - IVF: None  - Diet: Cardiac    DVT Prophy: SCDs, Lovenox  Atrophy: Ambulate  Lines: PIV    Dispo: Admit to cardiac telemetry    Outpatient records or previous hospital records reviewed.     Further recommendations pending patient's clinical course.  [facility] hospitalist to continue to follow patient while in house    Patient and/or patient's family given opportunity to ask questions and note understanding and agreeing with therapeutic plan as outlined    Thank You,  DO Krishna Hernandez Hospitalist  Answering Service number: [phone]         [1]   Past Medical History:   Arrhythmia    Cardiomyopathy (HCC)    Cataract    Congestive heart disease (HCC)    Glaucoma    Heart attack (HCC)    High blood pressure    Visual impairment    GLASSES   [2]   Past Surgical History:  Procedure Laterality Date    Cardiac defibrillator placement      Cataract      Colonoscopy N/A 11/08/2024    Procedure: COLONOSCOPY;  Surgeon: Wade Jay MD;  Location:  ENDOSCOPY    Eye surgery     [3] No Known Allergies  [4]   Outpatient Medications Marked as Taking for the 5/2/25 encounter (Hospital Encounter)   Medication Sig Dispense Refill    latanoprost 0.005 % Ophthalmic Solution Place 1 drop into the left eye nightly.      WEGOVY 2.4 MG/0.75ML Subcutaneous Solution Auto-injector Inject 0.75 mL (2.4 mg total) into the skin once a week. Every Saturday      ENTRESTO 24-26 MG Oral Tab Take 1 tablet by mouth 2 (two) times daily.      carvedilol 25 MG Oral Tab Take 0.5 tablets (12.5 mg total) by mouth 2 (two) times daily with meals. 60 tablet 2   [5] No family history on file.

## 2025-05-02 NOTE — PLAN OF CARE
Echo unremarkable. Plan to start flecanide 100 mg BID and reduce coreg back to 12.5 gm BID follow up with EP as scheduled.

## 2025-05-02 NOTE — ED PROVIDER NOTES
Patient Seen in: [facility] Emergency Department      History     Chief Complaint   Patient presents with    Chest Pain Angina     Stated Complaint: ICD fired on Saturday while in the [place]. got back yestrday night.    Subjective:   49-year-old female, history of cardiomyopathy, history of ventricular tachycardia, status post ICD placement last summer, presents with family with complaints of chest pain and ICD discharge.  States last week when she was in [facility].  States she was dancing at a club and her ICD fired 3 times.  States she felt all 3 shocks.  She got home this week, cardiologist office called her today and told her she should come to the ER for evaluation.  Says she is having chest pain that started 9:00 this morning.  Mild, no shortness of breath or pleurisy.          History of Present Illness               Objective:     Past Medical History:    Arrhythmia    Cardiomyopathy (HCC)    Cataract    Congestive heart disease (HCC)    Glaucoma    Heart attack (HCC)    High blood pressure    Visual impairment    GLASSES              Past Surgical History:   Procedure Laterality Date    Cardiac defibrillator placement      Cataract      Colonoscopy N/A 11/08/2024    Procedure: COLONOSCOPY;  Surgeon: Wade Jay MD;  Location:  ENDOSCOPY    Eye surgery                  Social History     Socioeconomic History    Marital status:    Tobacco Use    Smoking status: Never    Smokeless tobacco: Never   Vaping Use    Vaping status: Never Used   Substance and Sexual Activity    Alcohol use: Never    Drug use: Never     Social Drivers of Health     Food Insecurity: No Food Insecurity (5/20/2024)    Received from [facility]    Food Insecurity     Currently or in the past 3 months, have you worried your food would run out before you had money to buy more?: No     In the past 12 months, have you run out of food or been unable to get more?: No   Transportation Needs: No  Transportation Needs (5/20/2024)    Received from [facility]    Transportation Needs     Currently or in the past 3 months, has lack of transportation kept you from medical appointments, getting food or medicine, or providing care to a family member?: No     Medical Transportation Needs?: No    Received from [facility]    Housing Stability                                Physical Exam     ED Triage Vitals [05/02/25 1246]   /57   Pulse 71   Resp 18   Temp 98.1 °F (36.7 °C)   Temp src Oral   SpO2 100 %   O2 Device None (Room air)       Current Vitals:   Vital Signs  BP: 120/73  Pulse: 69  Resp: 23  Temp: 98.1 °F (36.7 °C)  Temp src: Oral  MAP (mmHg): 88    Oxygen Therapy  SpO2: 100 %  O2 Device: None (Room air)        Physical Exam  Vitals and nursing note reviewed.   Constitutional:       General: She is not in acute distress.     Appearance: She is not toxic-appearing.   HENT:      Head: Normocephalic.   Cardiovascular:      Rate and Rhythm: Normal rate.      Pulses:           Radial pulses are 2+ on the right side and 2+ on the left side.   Pulmonary:      Effort: Pulmonary effort is normal.      Breath sounds: Normal breath sounds.   Abdominal:      Palpations: Abdomen is soft. There is no mass.      Tenderness: There is no abdominal tenderness. There is no guarding or rebound.   Musculoskeletal:         General: Normal range of motion.      Cervical back: Normal range of motion and neck supple.   Skin:     General: Skin is warm and dry.   Neurological:      General: No focal deficit present.      Mental Status: She is alert.           Physical Exam                ED Course     Labs Reviewed   COMP METABOLIC PANEL (14) - Abnormal; Notable for the following components:       Result Value    BUN 8 (*)     All other components within normal limits   CBC WITH DIFFERENTIAL WITH PLATELET - Abnormal; Notable for the following components:    HCT 34.6 (*)     All other  components within normal limits   TROPONIN I HIGH SENSITIVITY - Normal   PRO BETA NATRIURETIC PEPTIDE - Normal   MAGNESIUM - Normal   PROTHROMBIN TIME (PT)   RAINBOW DRAW BLUE     EKG    Rate, intervals and axes as noted on EKG Report.  Rate: 68  Rhythm: Sinus Rhythm  Reading: EKG sinus rhythm 60 bpm.  Normal axis.  No salvation.  , QRS 88,  ms.  When compared to April 2025, no significant changes are noted    Patient placed on cardiac monitor for telemetry monitoring secondary to cp. Interpretation at bedside by me is sinus rhythm.                Results                                 MDM     I independent interpreted x-ray of the chest without any obvious signs of acute infiltrate    Family at bedside helpful to provide information on history presenting illness    Differential diagnosis includes, but not limited to, ICD discharge, acute chest pain, ACS costochondritis, arrhythmia    External chart review demonstrates her outpatient cardiology visits with [facility] cardiology in the past    49-year-old female with acute chest pain but also had defibrillator discharge in the [place] 6 days ago.  [employer] sensor report but only for the past 24 hours were calling the rep to get the  Report from last week.  She received a call from [facility] cardiology office today regarding the discharge last weekend so they are aware of this.  For troponin here is negative.  EKG stable.  No ST elevation.  Vital signs are stable.  Pulses are equal.  Resting in no distress.  Aspirin given.  Dr. Mcwilliams with cardiology consulted.  Admitted to Dr. Traore, awaiting bed assignment    Admission disposition: 5/2/2025  1:25 PM           Medical Decision Making      Disposition and Plan     Clinical Impression:  1. Acute chest pain    2. ICD (implantable cardioverter-defibrillator) discharge         Disposition:  Admit  5/2/2025  1:25 pm    Follow-up:  No follow-up provider specified.        Medications Prescribed:  Current Discharge  Medication List          Supplementary Documentation:         Hospital Problems       Present on Admission           ICD-10-CM Noted POA    * (Principal) Acute chest pain R07.9 5/2/2025 Unknown

## 2025-05-02 NOTE — ED INITIAL ASSESSMENT (HPI)
ICD fired while overseas on [place]. Today was called by [employer] to go to the ED. SOB and chest pain at  time of triage

## 2025-05-02 NOTE — CONSULTS
[facility] Electrophysiology Consult      Kimberly Evangelista Patient Status:  Emergency    1975 MRN [id number]   Location [facility] EMERGENCY DEPARTMENT Attending Joon Christy, DO   Hosp Day # 0 PCP Apoorva Squires MD       Kimberly Evangelista is a 49 year old year old female    In 2024, had runs of VT and low EF. MRI and cath were normal. ICD was placed.     Had further runs of VT    At EPS , unable to induce VT despite iso at 6 and then epinephrine gtt. AF was seen  Coreg was restarted at 12.5mg bid, a lower dose due to fatigue    Recenlty was in the [place] was dancing and had 3 shocks  [place] and we were alerted. Sent to ER    She feels fine    ICD interrogation: dual chamber [employer] ICD implanted . Multiple runs of NAVT and one long run which first shock failed and second shock broke, only to go back into VT which then terminated, and a committed shock was delivered during SR    Denies chest pain, shortness of breath, palpitations, lightheadedness, syncope, orthopnea, paroxysmal nocturnal dyspnea, or edema.    ROS: All other systems were reviewed and were negative.    Past Medical History:  Past Medical History[1]    Allergies:  Allergies[2]    Outpatient Medications:  Medications Ordered Prior to Encounter[3]     Scheduled Meds:  Scheduled Medications[4]    Infusion Meds:  Medication Infusions[5]    Social:   reports that she has never smoked. She has never used smokeless tobacco. She reports that she does not drink alcohol and does not use drugs.    Family History:  family history is not on file.    Physical:  /65   Pulse 66   Temp 98.1 °F (36.7 °C) (Oral)   Resp 25   Ht 63\"   Wt 189 lb (85.7 kg)   LMP 2025 (Exact Date)   SpO2 100%   BMI 33.48 kg/m²    No intake or output data in the 24 hours ending 25 1357  Wt Readings from Last 4 Encounters:   25 189 lb (85.7 kg)   25 188 lb (85.3 kg)   24 196 lb (88.9 kg)     GENERAL: well  developed, well nourished, in no apparent distress  EYES: sclera anicteric  HEENT: normocephalic  NECK: no JVD, no carotid bruits, no thyromegaly  RESPIRATORY: clear to auscultation  CARDIOVASCULAR: S1, S2 normal, RRR; no S3, no S4; no click; no murmur  ABDOMEN: normal active BS, soft, nondistended; nontender  EXTREMITIES: no cyanosis, clubbing or edema, peripheral pulses intact  NEURO: no sensorimotor deficits  PSYCHIATRIC: alert and oriented x 3, affect normal  SKIN: no rashes, left device site normal     Data:  ECG: SR 68 QTc 420  Tele: SR  Echo: 10/2024: EF 60-65, mild LAE    Labs:  Recent Labs   Lab 05/02/25  1251   WBC 6.7   HGB 12.4   .0       Recent Labs   Lab 05/02/25  1251      K 3.5      CO2 27.0   BUN 8*   CREATSERUM 0.78   CA 8.9   GLU 76       No results for input(s): \"INR\" in the last 168 hours.    No results for input(s): \"TROP\" in the last 168 hours.    No results found for: \"TSH\"    Impression:  Idiopathic VT  non-ischemic cardiomyopathy but then normalized    Plan:  Noninducible at EPS  Coreg 25mg bid  Check echo  If echo ok, home with flecainide 100mg bid and decrease coreg back t o12.5mg bid      Derrick Nair MD  Cardiology / Clinical Cardiac Electrophysiology  [facility]         [1]   Past Medical History:   Arrhythmia    Cardiomyopathy (HCC)    Cataract    Congestive heart disease (HCC)    Glaucoma    Heart attack (HCC)    High blood pressure    Visual impairment    GLASSES   [2] No Known Allergies  [3]   Current Facility-Administered Medications on File Prior to Encounter   Medication Dose Route Frequency Provider Last Rate Last Admin    [COMPLETED] sodium chloride 0.9% infusion   Intravenous On Call Derrick Nair MD   New Bag at 04/01/25 0712    [COMPLETED] heparin (Porcine) 5000 UNIT/ML injection             [COMPLETED] heparin in sodium chloride 0.9% (Porcine) 2 Units/mL flush bag premix             [COMPLETED] heparin (Porcine) 1000 UNIT/ML  injection             [COMPLETED] lidocaine-EPINEPHrine (Xylocaine-Epinephrine) 1 %-1:[id number] injection             [COMPLETED] isoproterenol (Isuprel) 0.2 mg/mL injection              Current Outpatient Medications on File Prior to Encounter   Medication Sig Dispense Refill    carvedilol 25 MG Oral Tab Take 0.5 tablets (12.5 mg total) by mouth 2 (two) times daily with meals. 60 tablet 2    sacubitril-valsartan 24-26 MG Oral Tab Take 1 tablet by mouth 2 (two) times daily.      semaglutide-weight management 0.5 MG/0.5ML Subcutaneous Solution Auto-injector Inject 0.5 mL (0.5 mg total) into the skin once a week.     [4] [5]

## 2025-05-02 NOTE — ED QUICK NOTES
Pacemaker interrogated using the supplied [employer] interrogation device with on screen confirmation of delivery of information.

## 2025-05-02 NOTE — ED QUICK NOTES
Orders for admission, patient is aware of plan and ready to go upstairs. Any questions, please call ED RN Sathya at extension [phone].     Patient Covid vaccination status: Unvaccinated     COVID Test Ordered in ED: None    COVID Suspicion at Admission: N/A    Running Infusions: Medication Infusions[1] None    Mental Status/LOC at time of transport: A/OX4    Other pertinent information:   CIWA score: N/A   NIH score:  N/A          [1]

## 2025-05-02 NOTE — PLAN OF CARE
RN received patient from ED around 1630, discharge pending results of echo. RN received message at 1650  from cardiology NP saying patient would be discharging. Patient stable in room. Hospitalist notified of cardiology clearing for discharge. Patient discharged home. IV discontinued without complications. Discharge paperwork, follow up care, and prescriptions reviewed and given. Discharge education teachback completed, patient verbalized understanding. All questions answered. Patient stable and escorted out via ambulating.

## (undated) DEVICE — KIT CUSTOM ENDOPROCEDURE STERIS

## (undated) DEVICE — 10FT COMBINED O2 DELIVERY/CO2 MONITORING. FILTER WITH MICROSTREAM TYPE LUER: Brand: DUAL ADULT NASAL CANNULA

## (undated) DEVICE — V2 SPECIMEN COLLECTION MANIFOLD KIT: Brand: NEPTUNE

## (undated) DEVICE — 3M™ RED DOT™ MONITORING ELECTRODE WITH FOAM TAPE AND STICKY GEL, 50/BAG, 20/CASE, 72/PLT 2570: Brand: RED DOT™

## (undated) DEVICE — KIT VLV 5 PC AIR H2O SUCT BX ENDOGATOR CONN

## (undated) DEVICE — 1200CC GUARDIAN II: Brand: GUARDIAN